# Patient Record
Sex: FEMALE | Race: WHITE | NOT HISPANIC OR LATINO | Employment: UNEMPLOYED | URBAN - METROPOLITAN AREA
[De-identification: names, ages, dates, MRNs, and addresses within clinical notes are randomized per-mention and may not be internally consistent; named-entity substitution may affect disease eponyms.]

---

## 2017-08-01 PROBLEM — R47.89 EPISODE OF CHANGE IN SPEECH: Status: ACTIVE | Noted: 2017-08-01

## 2017-08-02 PROBLEM — G47.00 INSOMNIA: Status: ACTIVE | Noted: 2017-08-02

## 2017-08-02 PROBLEM — F33.41 RECURRENT MAJOR DEPRESSIVE DISORDER, IN PARTIAL REMISSION: Status: ACTIVE | Noted: 2017-08-02

## 2017-08-02 PROBLEM — G43.009 MIGRAINE WITHOUT AURA AND WITHOUT STATUS MIGRAINOSUS, NOT INTRACTABLE: Status: ACTIVE | Noted: 2017-08-02

## 2018-10-12 PROBLEM — J34.0 ABSCESS OF NOSE: Status: ACTIVE | Noted: 2018-10-12

## 2018-10-15 PROBLEM — A49.02 MRSA (METHICILLIN RESISTANT STAPHYLOCOCCUS AUREUS) INFECTION: Status: ACTIVE | Noted: 2018-10-15

## 2018-11-07 PROCEDURE — 88342 IMHCHEM/IMCYTCHM 1ST ANTB: CPT | Performed by: PATHOLOGY

## 2018-11-20 ENCOUNTER — TELEPHONE (OUTPATIENT)
Dept: OBSTETRICS AND GYNECOLOGY | Facility: HOSPITAL | Age: 33
End: 2018-11-20

## 2018-11-20 NOTE — TELEPHONE ENCOUNTER
Called and left VM again. 3rd attempt to call patient regarding TVUS and US reports. Needs to be scheduled for [facility].

## 2018-12-13 PROBLEM — Z01.818 PREOP EXAMINATION: Status: ACTIVE | Noted: 2018-12-13

## 2018-12-20 PROBLEM — Z98.890 S/P CONIZATION OF CERVIX: Status: ACTIVE | Noted: 2018-12-20

## 2019-01-02 ENCOUNTER — TELEPHONE (OUTPATIENT)
Dept: GYNECOLOGIC ONCOLOGY | Facility: CLINIC | Age: 34
End: 2019-01-02

## 2019-01-03 PROBLEM — C53.9 ADENOCARCINOMA OF CERVIX: Status: ACTIVE | Noted: 2019-01-03

## 2019-02-14 PROBLEM — J34.0 ABSCESS OF NOSE: Status: RESOLVED | Noted: 2018-10-12 | Resolved: 2019-02-14

## 2019-02-14 PROBLEM — R47.89 EPISODE OF CHANGE IN SPEECH: Status: RESOLVED | Noted: 2017-08-01 | Resolved: 2019-02-14

## 2019-02-14 PROBLEM — Z01.818 PREOP EXAMINATION: Status: RESOLVED | Noted: 2018-12-13 | Resolved: 2019-02-14

## 2019-02-21 PROBLEM — Z90.722 S/P TAH-BSO: Status: ACTIVE | Noted: 2019-02-21

## 2019-02-21 PROBLEM — Z90.710 S/P TAH-BSO: Status: ACTIVE | Noted: 2019-02-21

## 2019-02-21 PROBLEM — C53.9: Status: ACTIVE | Noted: 2019-02-21

## 2019-02-21 PROBLEM — Z90.79 S/P TAH-BSO: Status: ACTIVE | Noted: 2019-02-21

## 2019-03-07 PROBLEM — T81.49XA POSTOPERATIVE ABSCESS: Status: ACTIVE | Noted: 2019-03-07

## 2019-03-08 PROBLEM — A41.9 SEPSIS: Status: ACTIVE | Noted: 2019-03-08

## 2019-03-08 PROBLEM — T78.40XA ALLERGIC REACTION CAUSED BY A DRUG: Status: ACTIVE | Noted: 2019-03-08

## 2019-03-09 PROBLEM — K57.30 DIVERTICULOSIS OF COLON: Status: ACTIVE | Noted: 2019-03-09

## 2019-03-09 PROBLEM — N73.9 PELVIC ABSCESS IN FEMALE: Status: ACTIVE | Noted: 2019-03-07

## 2019-03-12 PROBLEM — E83.42 HYPOMAGNESEMIA: Status: ACTIVE | Noted: 2019-03-12

## 2019-03-12 PROBLEM — E83.39 HYPOPHOSPHATASIA: Status: ACTIVE | Noted: 2019-03-12

## 2019-03-12 PROBLEM — E87.6 HYPOKALEMIA: Status: ACTIVE | Noted: 2019-03-12

## 2019-03-13 PROBLEM — E66.9 OBESITY (BMI 30-39.9): Status: ACTIVE | Noted: 2019-03-13

## 2019-03-13 PROBLEM — K65.1 INTRA-ABDOMINAL ABSCESS: Status: ACTIVE | Noted: 2019-03-13

## 2019-03-19 PROBLEM — E87.6 HYPOKALEMIA: Status: RESOLVED | Noted: 2019-03-12 | Resolved: 2019-03-19

## 2019-03-19 PROBLEM — N73.9 PELVIC ABSCESS IN FEMALE: Status: RESOLVED | Noted: 2019-03-07 | Resolved: 2019-03-19

## 2019-03-19 PROBLEM — A41.9 SEPSIS: Status: RESOLVED | Noted: 2019-03-08 | Resolved: 2019-03-19

## 2019-03-19 PROBLEM — E83.42 HYPOMAGNESEMIA: Status: RESOLVED | Noted: 2019-03-12 | Resolved: 2019-03-19

## 2019-03-19 PROBLEM — E83.39 HYPOPHOSPHATASIA: Status: RESOLVED | Noted: 2019-03-12 | Resolved: 2019-03-19

## 2019-09-04 ENCOUNTER — DOCUMENTATION ONLY (OUTPATIENT)
Dept: GYNECOLOGIC ONCOLOGY | Facility: CLINIC | Age: 34
End: 2019-09-04

## 2019-09-04 NOTE — PROGRESS NOTES
Patient has completed whole pelvic radiation.    She received 5000 cGy.    She also received 1800 cGy HDR.    Radiation Oncology records from [facility] have been scanned into epic

## 2021-05-06 ENCOUNTER — PATIENT MESSAGE (OUTPATIENT)
Dept: RESEARCH | Facility: HOSPITAL | Age: 36
End: 2021-05-06

## 2021-09-01 ENCOUNTER — HOSPITAL ENCOUNTER (EMERGENCY)
Facility: HOSPITAL | Age: 36
Discharge: HOME | End: 2021-09-01
Attending: SURGERY
Payer: MEDICAID

## 2021-09-01 PROCEDURE — 12001 RPR S/N/AX/GEN/TRNK 2.5CM/<: CPT

## 2021-09-01 PROCEDURE — 99284 EMERGENCY DEPT VISIT MOD MDM: CPT | Mod: 25

## 2021-09-01 PROCEDURE — 12005 RPR S/N/A/GEN/TRK12.6-20.0CM: CPT

## 2021-09-02 ENCOUNTER — PATIENT MESSAGE (OUTPATIENT)
Dept: FAMILY MEDICINE | Facility: CLINIC | Age: 36
End: 2021-09-02

## 2022-05-03 ENCOUNTER — OFFICE VISIT (OUTPATIENT)
Dept: SLEEP MEDICINE | Facility: CLINIC | Age: 37
End: 2022-05-03
Payer: MEDICAID

## 2022-05-03 VITALS — BODY MASS INDEX: 39.14 KG/M2 | HEIGHT: 64 IN | WEIGHT: 229.25 LBS

## 2022-05-03 DIAGNOSIS — R35.1 NOCTURIA: ICD-10-CM

## 2022-05-03 DIAGNOSIS — G47.10 HYPERSOMNOLENCE: ICD-10-CM

## 2022-05-03 DIAGNOSIS — R06.83 SNORING: Primary | ICD-10-CM

## 2022-05-03 DIAGNOSIS — F51.09 OTHER INSOMNIA NOT DUE TO A SUBSTANCE OR KNOWN PHYSIOLOGICAL CONDITION: ICD-10-CM

## 2022-05-03 PROCEDURE — 1159F MED LIST DOCD IN RCRD: CPT | Mod: CPTII,,, | Performed by: INTERNAL MEDICINE

## 2022-05-03 PROCEDURE — 99204 PR OFFICE/OUTPT VISIT, NEW, LEVL IV, 45-59 MIN: ICD-10-PCS | Mod: S$PBB,,, | Performed by: INTERNAL MEDICINE

## 2022-05-03 PROCEDURE — 99204 OFFICE O/P NEW MOD 45 MIN: CPT | Mod: S$PBB,,, | Performed by: INTERNAL MEDICINE

## 2022-05-03 PROCEDURE — 1159F PR MEDICATION LIST DOCUMENTED IN MEDICAL RECORD: ICD-10-PCS | Mod: CPTII,,, | Performed by: INTERNAL MEDICINE

## 2022-05-03 PROCEDURE — 3008F BODY MASS INDEX DOCD: CPT | Mod: CPTII,,, | Performed by: INTERNAL MEDICINE

## 2022-05-03 PROCEDURE — 99999 PR PBB SHADOW E&M-EST. PATIENT-LVL II: ICD-10-PCS | Mod: PBBFAC,,, | Performed by: INTERNAL MEDICINE

## 2022-05-03 PROCEDURE — 99999 PR PBB SHADOW E&M-EST. PATIENT-LVL II: CPT | Mod: PBBFAC,,, | Performed by: INTERNAL MEDICINE

## 2022-05-03 PROCEDURE — 99212 OFFICE O/P EST SF 10 MIN: CPT | Mod: PBBFAC | Performed by: INTERNAL MEDICINE

## 2022-05-03 PROCEDURE — 3008F PR BODY MASS INDEX (BMI) DOCUMENTED: ICD-10-PCS | Mod: CPTII,,, | Performed by: INTERNAL MEDICINE

## 2022-05-03 RX ORDER — CLONAZEPAM 1 MG/1
1 TABLET ORAL NIGHTLY
COMMUNITY

## 2022-05-03 NOTE — PROGRESS NOTES
"  Referred by Donte Bright MD     NEW PATIENT VISIT    Mirlande Gongora  is a pleasant 36 y.o. female  with PMH significant for Ovarian ca, seizure do (keppra started feb 2022). Hx tonsillectomy,  who presents today for evaluation of sleepiness, snoring.    SLEEP SCHEDULE   Bed Time 10 PM    Sleep Latency Not long   Arousals Frequent without sleep aid   Nocturia Frequent, once   Back to sleep    Wake time 9AM   Naps Tries not to   Work      Past Medical History:   Diagnosis Date    Adenocarcinoma of cervix     Anxiety     Blind right eye     Depression     Fatigue     Headache     Hx of psychiatric care     Psychiatric problem     Seizures     last seizure 12/2017     Therapy      Patient Active Problem List   Diagnosis    Recurrent major depressive disorder, in partial remission    Insomnia    Migraine without aura and without status migrainosus, not intractable    MRSA (methicillin resistant Staphylococcus aureus) infection    S/P conization of cervix    Adenocarcinoma of cervix    Adenocarcinoma of cervix, stage 1B2    S/P radical SARAH-BSO with pelvic lypmhadenectomy    Allergic reaction caused by a drug    Diverticulosis of colon    Obesity (BMI 30-39.9)    Intra-abdominal abscess       Current Outpatient Medications:     clonazePAM (KLONOPIN) 1 MG tablet, Take 1 mg by mouth every evening., Disp: , Rfl:     estradioL (ESTRACE) 2 MG tablet, Take 2 mg by mouth once daily., Disp: , Rfl:     FLUoxetine 40 MG capsule, Take 40 mg by mouth once daily., Disp: , Rfl:     levETIRAcetam (KEPPRA) 500 MG Tab, Take 500 mg by mouth 2 (two) times daily., Disp: , Rfl:     pantoprazole (PROTONIX) 40 MG tablet, Take 40 mg by mouth once daily., Disp: , Rfl:        Vitals:    05/03/22 1540   Weight: 104 kg (229 lb 4.5 oz)   Height: 5' 4" (1.626 m)     Physical Exam:    GEN:   Well-appearing  Psych:  Appropriate affect, demonstrates insight  SKIN:  No rash on the face or bridge of the nose  HEENT: MP3,  "     LABS:   Lab Results   Component Value Date    HGB 13.6 04/18/2022    CO2 25 04/18/2022       RECORDS REVIEWED PREVIOUSLY:    No prior sleep testing.    ASSESSMENT    PROBLEM DESCRIPTION/ Sx on Presentation  STATUS   screening PENNY   +loud snoring, snoring arousals, + no witnessed apneas  Her mother was diagnosed with PENNY and narcolepsy in 2006.     New   Daytime Sx   + sleepiness when inactive frequently  denies sleepiness when driving unless it's late at night  Denies cataplexy (seizure episodes witnessed by her  consistent with GTC)  ESS 11/24 on intake  New   Insomnia   Trouble falling asleep and staying asleep  Prior hypnotics:        Current hypnotics: clonazepam 1mg about 3 weeks ago has been helping    New   Nocturia   x 2 per sleep period  New   Seizure disorder   Seizures occurring more frequently of late  Managed by Neurology in [place], [place]  New   Other issues:     PLAN     -recommend sleep testing (will send through [facility])  -discussed trial therapy if PENNY present and the patient is  open to a trial of CPAP therapy  -driving precautions were discussed with the patient    RTC          The patient was given open opportunity to ask questions and/or express concerns about treatment plan.   All questions/concerns were discussed.     Two patient identifiers used prior to evaluation.

## 2022-06-07 ENCOUNTER — TELEPHONE (OUTPATIENT)
Dept: OTOLARYNGOLOGY | Facility: CLINIC | Age: 37
End: 2022-06-07
Payer: MEDICAID

## 2022-10-25 NOTE — TELEPHONE ENCOUNTER
Tried calling patient to discuss [facility] results. No answer. Patient needs to be seen on the next gyn oncology day. Scheduled for tomorrow, 1/3/19.     FINAL PATHOLOGIC DIAGNOSIS  Cone biopsy of cervix, cold knife conization:  -Endocervical adenocarcinoma (see comment and microscopic description)  Comment: In one section, the tumor appears to extend to the depth of the biopsy; however, due to lack of  orientation and fragmentation of the specimen, depth of invasion and surgical margins cannot be definitively  evaluated.   42.6

## 2022-12-21 ENCOUNTER — TELEPHONE (OUTPATIENT)
Dept: FAMILY MEDICINE | Facility: CLINIC | Age: 37
End: 2022-12-21
Payer: MEDICAID

## 2022-12-21 NOTE — TELEPHONE ENCOUNTER
Patient states she was under the care of Dr. Rios in [place] when he worked there. Requesting refill of medications listed in attached message. Writer advised patient Dr. Rios is unable to refill medications, will need to reach out to new pcp. Advised patient Dr. Rios is no longer offering primary care, and is now performing urgent care type appointments. Patient verbalized understanding.

## 2022-12-21 NOTE — TELEPHONE ENCOUNTER
----- Message from Juanita Ponce sent at 12/21/2022 10:25 AM CST -----  Contact: patient  Type:  RX Refill Request    Who Called:  patient  Refill or New Rx:  refill  RX Name and Strength:    1.clonazePAM (KLONOPIN) 1 MG tablet  2.estradioL (ESTRACE) 2 MG tablet  3.FLUoxetine 40 MG capsule  4.pantoprazole (PROTONIX) 40 MG tablet  5.promethazine (PHENERGAN) 25 MG Tab  How is the patient currently taking it? (ex. 1XDay):  as directed  Is this a 30 day or 90 day RX:  30  Preferred Pharmacy with phone number:    [id number] - [place] - [address] AT [facility]  [address] [address]  Phone: [phone] Fax: [phone]  Local or Mail Order:  local  Ordering Provider:  Blanca Live Call Back Number:  [phone] (home)   Additional Information: